# Patient Record
Sex: MALE | Race: WHITE | URBAN - NONMETROPOLITAN AREA
[De-identification: names, ages, dates, MRNs, and addresses within clinical notes are randomized per-mention and may not be internally consistent; named-entity substitution may affect disease eponyms.]

---

## 2024-06-26 ENCOUNTER — HOSPITAL ENCOUNTER (OUTPATIENT)
Dept: CT IMAGING | Age: 44
Discharge: HOME | End: 2024-06-26
Attending: RADIOLOGY

## 2024-06-26 DIAGNOSIS — Z00.6 EXAMINATION FOR NORMAL COMPARISON OR CONTROL IN CLINICAL RESEARCH: ICD-10-CM

## 2024-06-26 RX ORDER — POLYETHYLENE GLYCOL 3350 17 G/17G
17 POWDER, FOR SOLUTION ORAL DAILY
COMMUNITY

## 2024-06-26 RX ORDER — PANTOPRAZOLE SODIUM 40 MG/1
40 TABLET, DELAYED RELEASE ORAL DAILY
COMMUNITY

## 2024-06-27 ENCOUNTER — OFFICE VISIT (OUTPATIENT)
Dept: SURGERY | Age: 44
End: 2024-06-27

## 2024-06-27 VITALS
SYSTOLIC BLOOD PRESSURE: 122 MMHG | HEART RATE: 76 BPM | TEMPERATURE: 97.6 F | HEIGHT: 66 IN | RESPIRATION RATE: 18 BRPM | OXYGEN SATURATION: 98 % | BODY MASS INDEX: 26.2 KG/M2 | WEIGHT: 163 LBS | DIASTOLIC BLOOD PRESSURE: 82 MMHG

## 2024-06-27 DIAGNOSIS — K40.90 LEFT INGUINAL HERNIA: Primary | ICD-10-CM

## 2024-06-27 PROCEDURE — 99204 OFFICE O/P NEW MOD 45 MIN: CPT | Performed by: SURGERY

## 2024-07-08 ASSESSMENT — ENCOUNTER SYMPTOMS
ABDOMINAL PAIN: 1
ALLERGIC/IMMUNOLOGIC NEGATIVE: 1

## 2024-07-08 NOTE — PROGRESS NOTES
Renee Woodward MD  [facility]    Pt Name: Rodo Lerner  MRN: [id number]  YOB: 1980  Date of evaluation: 06/27/2024    Primary Care Physician: None, None  Patient evaluated at the request of  Dr. Dsouza   Reason for evaluation: hernia  IMPRESSIONS:       ICD-10-CM    1. Left inguinal hernia  K40.90         does not have a problem list on file.    PLANS:   Patient with moderate to large left inguinal hernia that is causing significant symptoms.  Has had it for a long time and has not had it fixed due to social reasons.  I discussed with him pathophysiology of disease process treatment options.  Discussed with him how it is repaired the risk benefits alternatives.  Discussed treatment options including open versus robotic.  I discussed the risk and benefits of both.  Discussed recurrence rate similar for both.  Would use meth and mesh in both and risk of chronic pain.  Patient expressed understanding, he would like to undergo surgery but due to self-pay status would like to undergo an open operation.  He is working with finance to see how this will be covered.  Pappy to schedule him for open left inguinal hernia repair with mesh at his convenience    SUBJECTIVE:   CC: Left groin bulge    History of Chief Complaint:    Rodo is a 44 y.o.male who has a known left inguinal hernia, he is dealt with it for a long time its gotten worse it is making it hard for him to work.  He is [employer] and is outside a lot, he says he is having to leave his job earlier than he should due to the fact it is uncomfortable and painful.  He has never had change in bowel habits no nausea no vomiting.  He says its gotten bigger over the last few years.    Past Medical History  Past Medical History:   Diagnosis Date    Anxiety and depression     GERD (gastroesophageal reflux disease)        Past Surgical History  Past Surgical History:   Procedure Laterality Date    FINGER AMPUTATION         Medications  Current Outpatient